# Patient Record
(demographics unavailable — no encounter records)

---

## 2025-03-02 NOTE — PHYSICAL EXAM
[No Acute Distress] : no acute distress [Well Nourished] : well nourished [Well Developed] : well developed [Well-Appearing] : well-appearing [Normal Sclera/Conjunctiva] : normal sclera/conjunctiva [PERRL] : pupils equal round and reactive to light [EOMI] : extraocular movements intact [Normal Outer Ear/Nose] : the outer ears and nose were normal in appearance [Normal Oropharynx] : the oropharynx was normal [No JVD] : no jugular venous distention [No Lymphadenopathy] : no lymphadenopathy [Supple] : supple [Thyroid Normal, No Nodules] : the thyroid was normal and there were no nodules present [No Respiratory Distress] : no respiratory distress  [No Accessory Muscle Use] : no accessory muscle use [Clear to Auscultation] : lungs were clear to auscultation bilaterally [Normal Rate] : normal rate  [Regular Rhythm] : with a regular rhythm [Normal S1, S2] : normal S1 and S2 [No Murmur] : no murmur heard [No Carotid Bruits] : no carotid bruits [No Abdominal Bruit] : a ~M bruit was not heard ~T in the abdomen [No Varicosities] : no varicosities [Pedal Pulses Present] : the pedal pulses are present [No Edema] : there was no peripheral edema [No Palpable Aorta] : no palpable aorta [No Extremity Clubbing/Cyanosis] : no extremity clubbing/cyanosis [Normal Appearance] : normal in appearance [Soft] : abdomen soft [Non Tender] : non-tender [Non-distended] : non-distended [No Masses] : no abdominal mass palpated [No HSM] : no HSM [Normal Bowel Sounds] : normal bowel sounds [Normal Posterior Cervical Nodes] : no posterior cervical lymphadenopathy [Normal Anterior Cervical Nodes] : no anterior cervical lymphadenopathy [No CVA Tenderness] : no CVA  tenderness [No Spinal Tenderness] : no spinal tenderness [No Joint Swelling] : no joint swelling [Grossly Normal Strength/Tone] : grossly normal strength/tone [No Rash] : no rash [Coordination Grossly Intact] : coordination grossly intact [No Focal Deficits] : no focal deficits [Normal Gait] : normal gait [Deep Tendon Reflexes (DTR)] : deep tendon reflexes were 2+ and symmetric [Normal Affect] : the affect was normal [Normal Insight/Judgement] : insight and judgment were intact [de-identified] : Smooth slightly palpable thyroid gland bilaterally right greater than left , no discrete nodule [de-identified] : healed hernia scar

## 2025-03-02 NOTE — HEALTH RISK ASSESSMENT
[No] : No [No falls in past year] : Patient reported no falls in the past year [0] : 2) Feeling down, depressed, or hopeless: Not at all (0) [PHQ-2 Negative - No further assessment needed] : PHQ-2 Negative - No further assessment needed [Never] : Never [Time Spent: ___ Minutes] : I spent [unfilled] minutes performing a depression screening for this patient. [Audit-CScore] : 0 [[id number]] : 0

## 2025-03-02 NOTE — HISTORY OF PRESENT ILLNESS
[de-identified] : 56 yo Chinese F, , 17 and 18 yo [employer], here for CPE. 2025-here to review labs done prior to CPE. History of heavy menses follow-up CBC and iron studies were normal Elevated TSH 6.35 noted, free T41.2 In normal range.  Prior TSH normal between 1.8-2.7 Clinically euthyroid.  History of URI in December 2024, possible  hx thyroid goiter   Family history of thyroid conditions and 2 brothers  Hx of GERD-improved after EGD 2024 revealed H. pylori gastritis, treated with antibiotics by GI Dr. New With resolution of GERD.  No stool cultures follow-up Colonoscopy and EGD 3/1/2024-h pylori gastritis Cecal bx negative, CT abd negative x right adnexal cyst  Mammogram 3/24/22 normal-[facility]  Xuebmary carmen Crews-pap UTD Declines flu vaccine

## 2025-03-02 NOTE — HISTORY OF PRESENT ILLNESS
[de-identified] : 54 yo Chinese F, , 17 and 20 yo [employer], here for CPE. 2025-here to review labs done prior to CPE. History of heavy menses follow-up CBC and iron studies were normal Elevated TSH 6.35 noted, free T41.2 In normal range.  Prior TSH normal between 1.8-2.7 Clinically euthyroid.  History of URI in December 2024, possible  hx thyroid goiter   Family history of thyroid conditions and 2 brothers  Hx of GERD-improved after EGD 2024 revealed H. pylori gastritis, treated with antibiotics by GI Dr. New With resolution of GERD.  No stool cultures follow-up Colonoscopy and EGD 3/1/2024-h pylori gastritis Cecal bx negative, CT abd negative x right adnexal cyst  Mammogram 3/24/22 normal-[facility]  Xuebmary carmen Crews-pap UTD Declines flu vaccine

## 2025-03-02 NOTE — PHYSICAL EXAM
[No Acute Distress] : no acute distress [Well Nourished] : well nourished [Well Developed] : well developed [Well-Appearing] : well-appearing [Normal Sclera/Conjunctiva] : normal sclera/conjunctiva [PERRL] : pupils equal round and reactive to light [EOMI] : extraocular movements intact [Normal Outer Ear/Nose] : the outer ears and nose were normal in appearance [Normal Oropharynx] : the oropharynx was normal [No JVD] : no jugular venous distention [No Lymphadenopathy] : no lymphadenopathy [Supple] : supple [Thyroid Normal, No Nodules] : the thyroid was normal and there were no nodules present [No Respiratory Distress] : no respiratory distress  [No Accessory Muscle Use] : no accessory muscle use [Clear to Auscultation] : lungs were clear to auscultation bilaterally [Normal Rate] : normal rate  [Regular Rhythm] : with a regular rhythm [Normal S1, S2] : normal S1 and S2 [No Murmur] : no murmur heard [No Carotid Bruits] : no carotid bruits [No Abdominal Bruit] : a ~M bruit was not heard ~T in the abdomen [No Varicosities] : no varicosities [Pedal Pulses Present] : the pedal pulses are present [No Edema] : there was no peripheral edema [No Palpable Aorta] : no palpable aorta [No Extremity Clubbing/Cyanosis] : no extremity clubbing/cyanosis [Normal Appearance] : normal in appearance [Soft] : abdomen soft [Non Tender] : non-tender [Non-distended] : non-distended [No Masses] : no abdominal mass palpated [No HSM] : no HSM [Normal Bowel Sounds] : normal bowel sounds [Normal Posterior Cervical Nodes] : no posterior cervical lymphadenopathy [Normal Anterior Cervical Nodes] : no anterior cervical lymphadenopathy [No CVA Tenderness] : no CVA  tenderness [No Spinal Tenderness] : no spinal tenderness [No Joint Swelling] : no joint swelling [Grossly Normal Strength/Tone] : grossly normal strength/tone [No Rash] : no rash [Coordination Grossly Intact] : coordination grossly intact [No Focal Deficits] : no focal deficits [Normal Gait] : normal gait [Deep Tendon Reflexes (DTR)] : deep tendon reflexes were 2+ and symmetric [Normal Affect] : the affect was normal [Normal Insight/Judgement] : insight and judgment were intact [de-identified] : Smooth slightly palpable thyroid gland bilaterally right greater than left , no discrete nodule [de-identified] : healed hernia scar

## 2025-03-02 NOTE — PHYSICAL EXAM
[No Acute Distress] : no acute distress [Well Nourished] : well nourished [Well Developed] : well developed [Well-Appearing] : well-appearing [Normal Sclera/Conjunctiva] : normal sclera/conjunctiva [PERRL] : pupils equal round and reactive to light [EOMI] : extraocular movements intact [Normal Outer Ear/Nose] : the outer ears and nose were normal in appearance [Normal Oropharynx] : the oropharynx was normal [No JVD] : no jugular venous distention [No Lymphadenopathy] : no lymphadenopathy [Supple] : supple [Thyroid Normal, No Nodules] : the thyroid was normal and there were no nodules present [No Respiratory Distress] : no respiratory distress  [No Accessory Muscle Use] : no accessory muscle use [Clear to Auscultation] : lungs were clear to auscultation bilaterally [Normal Rate] : normal rate  [Regular Rhythm] : with a regular rhythm [Normal S1, S2] : normal S1 and S2 [No Murmur] : no murmur heard [No Carotid Bruits] : no carotid bruits [No Abdominal Bruit] : a ~M bruit was not heard ~T in the abdomen [No Varicosities] : no varicosities [Pedal Pulses Present] : the pedal pulses are present [No Edema] : there was no peripheral edema [No Palpable Aorta] : no palpable aorta [No Extremity Clubbing/Cyanosis] : no extremity clubbing/cyanosis [Normal Appearance] : normal in appearance [Soft] : abdomen soft [Non Tender] : non-tender [Non-distended] : non-distended [No Masses] : no abdominal mass palpated [No HSM] : no HSM [Normal Bowel Sounds] : normal bowel sounds [Normal Posterior Cervical Nodes] : no posterior cervical lymphadenopathy [Normal Anterior Cervical Nodes] : no anterior cervical lymphadenopathy [No CVA Tenderness] : no CVA  tenderness [No Spinal Tenderness] : no spinal tenderness [No Joint Swelling] : no joint swelling [Grossly Normal Strength/Tone] : grossly normal strength/tone [No Rash] : no rash [Coordination Grossly Intact] : coordination grossly intact [No Focal Deficits] : no focal deficits [Normal Gait] : normal gait [Deep Tendon Reflexes (DTR)] : deep tendon reflexes were 2+ and symmetric [Normal Affect] : the affect was normal [Normal Insight/Judgement] : insight and judgment were intact [de-identified] : Smooth slightly palpable thyroid gland bilaterally right greater than left , no discrete nodule [de-identified] : healed hernia scar

## 2025-03-02 NOTE — HEALTH RISK ASSESSMENT
[No] : No [No falls in past year] : Patient reported no falls in the past year [0] : 2) Feeling down, depressed, or hopeless: Not at all (0) [PHQ-2 Negative - No further assessment needed] : PHQ-2 Negative - No further assessment needed [Never] : Never [Time Spent: ___ Minutes] : I spent [unfilled] minutes performing a depression screening for this patient. [Audit-CScore] : 0 [[id number]] : 0

## 2025-03-02 NOTE — HEALTH RISK ASSESSMENT
[No] : No [No falls in past year] : Patient reported no falls in the past year [0] : 2) Feeling down, depressed, or hopeless: Not at all (0) [PHQ-2 Negative - No further assessment needed] : PHQ-2 Negative - No further assessment needed [Never] : Never [Time Spent: ___ Minutes] : I spent [unfilled] minutes performing a depression screening for this patient. [Audit-CScore] : 0 [[id number]] : 0

## 2025-03-02 NOTE — HISTORY OF PRESENT ILLNESS
[de-identified] : 54 yo Chinese F, , 17 and 18 yo [employer], here for CPE. 2025-here to review labs done prior to CPE. History of heavy menses follow-up CBC and iron studies were normal Elevated TSH 6.35 noted, free T41.2 In normal range.  Prior TSH normal between 1.8-2.7 Clinically euthyroid.  History of URI in December 2024, possible  hx thyroid goiter   Family history of thyroid conditions and 2 brothers  Hx of GERD-improved after EGD 2024 revealed H. pylori gastritis, treated with antibiotics by GI Dr. New With resolution of GERD.  No stool cultures follow-up Colonoscopy and EGD 3/1/2024-h pylori gastritis Cecal bx negative, CT abd negative x right adnexal cyst  Mammogram 3/24/22 normal-[facility]  Xuebmary carmen Crews-pap UTD Declines flu vaccine

## 2025-05-23 NOTE — REVIEW OF SYSTEMS
[Itching] : Itching [Skin Rash] : skin rash [Mole Changes] : no mole changes [Nail Changes] : no nail changes

## 2025-05-23 NOTE — ASSESSMENT
[FreeTextEntry1] : May 21, 2025 5:00 PM Asked by the nurse to speak to patient who called the office requesting to be evaluated for hives that she noted about 2 weeks ago in the past week a few times over the weekend and today when she she felt she had to catch her breath. Patient agreed to telehealth visit with audio and visual encounter if possible through [employer]. Patient is a 55-year-old female, last seen by me for a physical exam on February 25, 2025. History of subclinical hypothyroidism with borderline elevated TSH of 6.35 and a free T4 in the normal range 1.2.  Clinically euthyroid with repeat labs done at [facility] recently also noted to be in a normal range. PMHx notable for H. pylori gastritis that was treated by her gastroenterologist after her EGD in 2024 with resolution of GERD symptoms. Social history notable for patient being  with a 17 and 19-year-old attending [employer].  Reports increased stress about 3 weeks ago, followed by the onset of urticarial lesions on bilateral buttocks and abdomen and legs.  She denies any new foods, soaps or detergents, but does use scented products.  She confirms last week that she did feel that she had to catch her breath at times, no wheezing.  She self medicated herself with some leftover dose of liquid Claritin that was used by her son, but did not have any relief. Plan as outlined Switch to a stronger 24-hour antihistamine with sedative effect such as Zyrtec/cetirizine 10 mg a day to begin today and continue for the next 2 to 4 weeks until there has been no new eruptions develop for least a week The same time use tepid showers and using soap that is unscented followed by lotion the entire body using moisturizing cream that is unscented such as CeraVe Eucerin or Cetaphil Apparently not drinking much water and made aware the importance of hydration with fluids/water up to eat to 10 glasses of water a day To begin tapering Distamine in 1 to 2 months at the lesions have gone from but please 1 to 2 weeks Will follow-up in the next week/refer to Derm if clinically warranted

## 2025-05-23 NOTE — HISTORY OF PRESENT ILLNESS
[Home] : at home, [unfilled] , at the time of the visit. [Medical Office: ([facility])___] : at the medical office located in  [Telehealth (audio & video)] : This visit was provided via telehealth using real-time 2-way audio visual technology. [Verbal consent obtained from patient] : the patient, [unfilled] [FreeTextEntry8] : May 21, 2025 5:00 PM Asked by the nurse to speak to patient who called the office requesting to be evaluated for hives that she noted about 2 weeks ago in the past week a few times over the weekend and today when she she felt she had to catch her breath. Patient agreed to telehealth visit with audio and visual encounter if possible through [employer]. Patient is a 55-year-old female, last seen by me for a physical exam on February 25, 2025. History of subclinical hypothyroidism with borderline elevated TSH of 6.35 and a free T4 in the normal range 1.2.  Clinically euthyroid with repeat labs done at [facility] recently also noted to be in a normal range. PMHx notable for H. pylori gastritis that was treated by her gastroenterologist after her EGD in 2024 with resolution of GERD symptoms. Social history notable for patient being  with a 17 and 19-year-old attending [employer].  Reports increased stress about 3 weeks ago, followed by the onset of urticarial lesions on bilateral buttocks and abdomen and legs.  She denies any new foods, soaps or detergents, but does use scented products.  She confirms last week that she did feel that she had to catch her breath at times, no wheezing.  She self medicated herself with some leftover dose of liquid Claritin that was used by her son, but did not have any relief.

## 2025-05-23 NOTE — PHYSICAL EXAM
[Normal] : no acute distress, well nourished, well developed and well-appearing [de-identified] : Patches of eczematous dermatitis after urticarial lesions was scratched by patient noted on bilateral buttocks and a few on extremities